# Patient Record
Sex: MALE | Race: WHITE | Employment: OTHER | URBAN - NONMETROPOLITAN AREA
[De-identification: names, ages, dates, MRNs, and addresses within clinical notes are randomized per-mention and may not be internally consistent; named-entity substitution may affect disease eponyms.]

---

## 2021-02-14 ENCOUNTER — HOSPITAL ENCOUNTER (INPATIENT)
Age: 71
LOS: 2 days | Discharge: HOME | DRG: 179 | End: 2021-02-16
Attending: INTERNAL MEDICINE | Admitting: INTERNAL MEDICINE
Payer: MEDICARE

## 2021-02-14 PROBLEM — U07.1 COVID-19 VIRUS INFECTION: Status: ACTIVE | Noted: 2021-02-14

## 2021-02-14 LAB
AMYLASE: 74 U/L (ref 28–100)
LIPASE: 112 U/L (ref 13–60)
PRO-BNP: 114 PG/ML (ref 0–900)
SARS-COV-2: POSITIVE
TROPONIN: 0.02 NG/ML (ref 0–0.03)

## 2021-02-14 PROCEDURE — 83880 ASSAY OF NATRIURETIC PEPTIDE: CPT

## 2021-02-14 PROCEDURE — 82150 ASSAY OF AMYLASE: CPT

## 2021-02-14 PROCEDURE — 2500000003 HC RX 250 WO HCPCS: Performed by: INTERNAL MEDICINE

## 2021-02-14 PROCEDURE — 2700000000 HC OXYGEN THERAPY PER DAY

## 2021-02-14 PROCEDURE — 2100000000 HC CCU R&B

## 2021-02-14 PROCEDURE — 6370000000 HC RX 637 (ALT 250 FOR IP): Performed by: INTERNAL MEDICINE

## 2021-02-14 PROCEDURE — 84484 ASSAY OF TROPONIN QUANT: CPT

## 2021-02-14 PROCEDURE — 6360000002 HC RX W HCPCS: Performed by: INTERNAL MEDICINE

## 2021-02-14 PROCEDURE — 83690 ASSAY OF LIPASE: CPT

## 2021-02-14 RX ORDER — NITROGLYCERIN 0.4 MG/1
0.4 TABLET SUBLINGUAL EVERY 5 MIN PRN
Status: ACTIVE | OUTPATIENT
Start: 2021-02-14 | End: 2021-02-15

## 2021-02-14 RX ORDER — PROMETHAZINE HYDROCHLORIDE 12.5 MG/1
12.5 TABLET ORAL EVERY 6 HOURS PRN
Status: DISCONTINUED | OUTPATIENT
Start: 2021-02-14 | End: 2021-02-16 | Stop reason: HOSPADM

## 2021-02-14 RX ORDER — ASPIRIN 81 MG/1
81 TABLET, CHEWABLE ORAL DAILY
Status: DISCONTINUED | OUTPATIENT
Start: 2021-02-15 | End: 2021-02-16 | Stop reason: HOSPADM

## 2021-02-14 RX ORDER — ZINC SULFATE 50(220)MG
50 CAPSULE ORAL DAILY
Status: DISCONTINUED | OUTPATIENT
Start: 2021-02-14 | End: 2021-02-16 | Stop reason: HOSPADM

## 2021-02-14 RX ORDER — NITROGLYCERIN 0.4 MG/1
0.4 TABLET SUBLINGUAL EVERY 5 MIN PRN
Status: DISCONTINUED | OUTPATIENT
Start: 2021-02-14 | End: 2021-02-16 | Stop reason: HOSPADM

## 2021-02-14 RX ORDER — SODIUM CHLORIDE 9 MG/ML
500 INJECTION, SOLUTION INTRAVENOUS CONTINUOUS PRN
Status: ACTIVE | OUTPATIENT
Start: 2021-02-14 | End: 2021-02-15

## 2021-02-14 RX ORDER — SODIUM CHLORIDE 0.9 % (FLUSH) 0.9 %
10 SYRINGE (ML) INJECTION PRN
Status: ACTIVE | OUTPATIENT
Start: 2021-02-14 | End: 2021-02-15

## 2021-02-14 RX ORDER — ACETAMINOPHEN 325 MG/1
650 TABLET ORAL EVERY 6 HOURS PRN
Status: DISCONTINUED | OUTPATIENT
Start: 2021-02-14 | End: 2021-02-16 | Stop reason: HOSPADM

## 2021-02-14 RX ORDER — SODIUM CHLORIDE 0.9 % (FLUSH) 0.9 %
10 SYRINGE (ML) INJECTION EVERY 12 HOURS SCHEDULED
Status: DISCONTINUED | OUTPATIENT
Start: 2021-02-14 | End: 2021-02-16 | Stop reason: HOSPADM

## 2021-02-14 RX ORDER — SODIUM CHLORIDE 0.9 % (FLUSH) 0.9 %
10 SYRINGE (ML) INJECTION PRN
Status: DISCONTINUED | OUTPATIENT
Start: 2021-02-14 | End: 2021-02-16 | Stop reason: HOSPADM

## 2021-02-14 RX ORDER — ONDANSETRON 2 MG/ML
4 INJECTION INTRAMUSCULAR; INTRAVENOUS EVERY 6 HOURS PRN
Status: DISCONTINUED | OUTPATIENT
Start: 2021-02-14 | End: 2021-02-16 | Stop reason: HOSPADM

## 2021-02-14 RX ORDER — ATORVASTATIN CALCIUM 20 MG/1
20 TABLET, FILM COATED ORAL NIGHTLY
Status: DISCONTINUED | OUTPATIENT
Start: 2021-02-14 | End: 2021-02-16 | Stop reason: HOSPADM

## 2021-02-14 RX ORDER — DOBUTAMINE HYDROCHLORIDE 200 MG/100ML
10 INJECTION INTRAVENOUS CONTINUOUS
Status: DISCONTINUED | OUTPATIENT
Start: 2021-02-14 | End: 2021-02-15

## 2021-02-14 RX ORDER — MONTELUKAST SODIUM 10 MG/1
10 TABLET ORAL NIGHTLY
Status: DISCONTINUED | OUTPATIENT
Start: 2021-02-14 | End: 2021-02-16 | Stop reason: HOSPADM

## 2021-02-14 RX ORDER — POLYETHYLENE GLYCOL 3350 17 G/17G
17 POWDER, FOR SOLUTION ORAL DAILY PRN
Status: DISCONTINUED | OUTPATIENT
Start: 2021-02-14 | End: 2021-02-16 | Stop reason: HOSPADM

## 2021-02-14 RX ORDER — ACETAMINOPHEN 650 MG/1
650 SUPPOSITORY RECTAL EVERY 6 HOURS PRN
Status: DISCONTINUED | OUTPATIENT
Start: 2021-02-14 | End: 2021-02-16 | Stop reason: HOSPADM

## 2021-02-14 RX ORDER — METOPROLOL TARTRATE 5 MG/5ML
5 INJECTION INTRAVENOUS EVERY 5 MIN PRN
Status: ACTIVE | OUTPATIENT
Start: 2021-02-14 | End: 2021-02-15

## 2021-02-14 RX ORDER — ATROPINE SULFATE 0.1 MG/ML
0.5 INJECTION INTRAVENOUS EVERY 5 MIN PRN
Status: ACTIVE | OUTPATIENT
Start: 2021-02-14 | End: 2021-02-15

## 2021-02-14 RX ORDER — CARVEDILOL 3.12 MG/1
3.12 TABLET ORAL 2 TIMES DAILY WITH MEALS
Status: DISCONTINUED | OUTPATIENT
Start: 2021-02-14 | End: 2021-02-16 | Stop reason: HOSPADM

## 2021-02-14 RX ORDER — MECOBALAMIN 5000 MCG
10 TABLET,DISINTEGRATING ORAL DAILY
Status: DISCONTINUED | OUTPATIENT
Start: 2021-02-14 | End: 2021-02-16 | Stop reason: HOSPADM

## 2021-02-14 RX ORDER — ALBUTEROL SULFATE 90 UG/1
2 AEROSOL, METERED RESPIRATORY (INHALATION) PRN
Status: ACTIVE | OUTPATIENT
Start: 2021-02-14 | End: 2021-02-15

## 2021-02-14 RX ORDER — ERGOCALCIFEROL 1.25 MG/1
50000 CAPSULE ORAL WEEKLY
Status: DISCONTINUED | OUTPATIENT
Start: 2021-02-14 | End: 2021-02-16 | Stop reason: HOSPADM

## 2021-02-14 RX ADMIN — Medication 5000 UNITS: at 19:48

## 2021-02-14 RX ADMIN — FAMOTIDINE 20 MG: 10 INJECTION, SOLUTION INTRAVENOUS at 21:20

## 2021-02-14 RX ADMIN — ATORVASTATIN CALCIUM 20 MG: 20 TABLET, FILM COATED ORAL at 19:48

## 2021-02-14 RX ADMIN — Medication 10 MG: at 19:48

## 2021-02-14 RX ADMIN — ENOXAPARIN SODIUM 40 MG: 40 INJECTION SUBCUTANEOUS at 19:48

## 2021-02-14 RX ADMIN — ZINC SULFATE 220 MG (50 MG) CAPSULE 50 MG: CAPSULE at 19:48

## 2021-02-14 RX ADMIN — MONTELUKAST SODIUM 10 MG: 10 TABLET, FILM COATED ORAL at 19:48

## 2021-02-14 RX ADMIN — CARVEDILOL 3.12 MG: 3.12 TABLET, FILM COATED ORAL at 19:48

## 2021-02-14 ASSESSMENT — ENCOUNTER SYMPTOMS
VOICE CHANGE: 0
BACK PAIN: 0
DIARRHEA: 0
NAUSEA: 0
SHORTNESS OF BREATH: 1
VOMITING: 0
COLOR CHANGE: 0
CONSTIPATION: 0

## 2021-02-15 ENCOUNTER — APPOINTMENT (OUTPATIENT)
Dept: NUCLEAR MEDICINE | Age: 71
DRG: 179 | End: 2021-02-15
Attending: INTERNAL MEDICINE
Payer: MEDICARE

## 2021-02-15 LAB
ALBUMIN SERPL-MCNC: 3.3 G/DL (ref 3.5–5.2)
ALP BLD-CCNC: 45 U/L (ref 40–130)
ALT SERPL-CCNC: 23 U/L (ref 5–41)
ANION GAP SERPL CALCULATED.3IONS-SCNC: 11 MMOL/L (ref 7–19)
AST SERPL-CCNC: 28 U/L (ref 5–40)
BILIRUB SERPL-MCNC: 0.5 MG/DL (ref 0.2–1.2)
BUN BLDV-MCNC: 22 MG/DL (ref 8–23)
CALCIUM SERPL-MCNC: 8.3 MG/DL (ref 8.8–10.2)
CHLORIDE BLD-SCNC: 106 MMOL/L (ref 98–111)
CHOLESTEROL, TOTAL: 76 MG/DL (ref 160–199)
CO2: 23 MMOL/L (ref 22–29)
CREAT SERPL-MCNC: 1.4 MG/DL (ref 0.5–1.2)
GFR AFRICAN AMERICAN: >59
GFR NON-AFRICAN AMERICAN: 50
GLUCOSE BLD-MCNC: 84 MG/DL (ref 74–109)
HCT VFR BLD CALC: 49.8 % (ref 42–52)
HDLC SERPL-MCNC: 28 MG/DL (ref 55–121)
HEMOGLOBIN: 15.9 G/DL (ref 14–18)
LDL CHOLESTEROL CALCULATED: 25 MG/DL
MCH RBC QN AUTO: 31.1 PG (ref 27–31)
MCHC RBC AUTO-ENTMCNC: 31.9 G/DL (ref 33–37)
MCV RBC AUTO: 97.5 FL (ref 80–94)
PDW BLD-RTO: 14.2 % (ref 11.5–14.5)
PLATELET # BLD: 98 K/UL (ref 130–400)
PMV BLD AUTO: 11.8 FL (ref 9.4–12.4)
POTASSIUM REFLEX MAGNESIUM: 4.4 MMOL/L (ref 3.5–5)
RBC # BLD: 5.11 M/UL (ref 4.7–6.1)
SODIUM BLD-SCNC: 140 MMOL/L (ref 136–145)
TOTAL PROTEIN: 6.3 G/DL (ref 6.6–8.7)
TRIGL SERPL-MCNC: 114 MG/DL (ref 0–149)
TROPONIN: 0.03 NG/ML (ref 0–0.03)
WBC # BLD: 6 K/UL (ref 4.8–10.8)

## 2021-02-15 PROCEDURE — 85027 COMPLETE CBC AUTOMATED: CPT

## 2021-02-15 PROCEDURE — 6370000000 HC RX 637 (ALT 250 FOR IP): Performed by: INTERNAL MEDICINE

## 2021-02-15 PROCEDURE — 2500000003 HC RX 250 WO HCPCS: Performed by: INTERNAL MEDICINE

## 2021-02-15 PROCEDURE — 80053 COMPREHEN METABOLIC PANEL: CPT

## 2021-02-15 PROCEDURE — 84484 ASSAY OF TROPONIN QUANT: CPT

## 2021-02-15 PROCEDURE — 93017 CV STRESS TEST TRACING ONLY: CPT

## 2021-02-15 PROCEDURE — A9500 TC99M SESTAMIBI: HCPCS | Performed by: INTERNAL MEDICINE

## 2021-02-15 PROCEDURE — 2100000000 HC CCU R&B

## 2021-02-15 PROCEDURE — 93005 ELECTROCARDIOGRAM TRACING: CPT | Performed by: INTERNAL MEDICINE

## 2021-02-15 PROCEDURE — 80061 LIPID PANEL: CPT

## 2021-02-15 PROCEDURE — 6370000000 HC RX 637 (ALT 250 FOR IP): Performed by: HOSPITALIST

## 2021-02-15 PROCEDURE — 2700000000 HC OXYGEN THERAPY PER DAY

## 2021-02-15 PROCEDURE — 3430000000 HC RX DIAGNOSTIC RADIOPHARMACEUTICAL: Performed by: INTERNAL MEDICINE

## 2021-02-15 PROCEDURE — 2580000003 HC RX 258: Performed by: INTERNAL MEDICINE

## 2021-02-15 PROCEDURE — 6360000002 HC RX W HCPCS: Performed by: INTERNAL MEDICINE

## 2021-02-15 RX ORDER — ATROPINE SULFATE 0.1 MG/ML
0.5 INJECTION INTRAVENOUS EVERY 5 MIN PRN
Status: ACTIVE | OUTPATIENT
Start: 2021-02-15 | End: 2021-02-16

## 2021-02-15 RX ORDER — GLIPIZIDE 2.5 MG/1
2.5 TABLET, EXTENDED RELEASE ORAL DAILY
COMMUNITY

## 2021-02-15 RX ORDER — LISINOPRIL 20 MG/1
20 TABLET ORAL DAILY
COMMUNITY

## 2021-02-15 RX ORDER — CHOLECALCIFEROL (VITAMIN D3) 50 MCG
2000 TABLET ORAL DAILY
COMMUNITY

## 2021-02-15 RX ORDER — NITROGLYCERIN 0.4 MG/1
0.4 TABLET SUBLINGUAL EVERY 5 MIN PRN
Status: ACTIVE | OUTPATIENT
Start: 2021-02-15 | End: 2021-02-16

## 2021-02-15 RX ORDER — ALLOPURINOL 300 MG/1
300 TABLET ORAL DAILY
COMMUNITY

## 2021-02-15 RX ORDER — ALBUTEROL SULFATE 90 UG/1
2 AEROSOL, METERED RESPIRATORY (INHALATION) PRN
Status: ACTIVE | OUTPATIENT
Start: 2021-02-15 | End: 2021-02-16

## 2021-02-15 RX ORDER — PIOGLITAZONEHYDROCHLORIDE 15 MG/1
15 TABLET ORAL DAILY
COMMUNITY

## 2021-02-15 RX ORDER — ATORVASTATIN CALCIUM 10 MG/1
10 TABLET, FILM COATED ORAL DAILY
COMMUNITY

## 2021-02-15 RX ORDER — METOPROLOL TARTRATE 5 MG/5ML
5 INJECTION INTRAVENOUS EVERY 5 MIN PRN
Status: ACTIVE | OUTPATIENT
Start: 2021-02-15 | End: 2021-02-16

## 2021-02-15 RX ORDER — SODIUM CHLORIDE 0.9 % (FLUSH) 0.9 %
10 SYRINGE (ML) INJECTION PRN
Status: ACTIVE | OUTPATIENT
Start: 2021-02-15 | End: 2021-02-16

## 2021-02-15 RX ORDER — GABAPENTIN 300 MG/1
300 CAPSULE ORAL 4 TIMES DAILY
Status: DISCONTINUED | OUTPATIENT
Start: 2021-02-15 | End: 2021-02-16 | Stop reason: HOSPADM

## 2021-02-15 RX ORDER — AMINOPHYLLINE DIHYDRATE 25 MG/ML
50 INJECTION, SOLUTION INTRAVENOUS PRN
Status: ACTIVE | OUTPATIENT
Start: 2021-02-15 | End: 2021-02-16

## 2021-02-15 RX ORDER — GABAPENTIN 300 MG/1
300 CAPSULE ORAL 4 TIMES DAILY
COMMUNITY

## 2021-02-15 RX ORDER — SODIUM CHLORIDE 9 MG/ML
500 INJECTION, SOLUTION INTRAVENOUS CONTINUOUS PRN
Status: ACTIVE | OUTPATIENT
Start: 2021-02-15 | End: 2021-02-16

## 2021-02-15 RX ADMIN — CARVEDILOL 3.12 MG: 3.12 TABLET, FILM COATED ORAL at 17:28

## 2021-02-15 RX ADMIN — TETRAKIS(2-METHOXYISOBUTYLISOCYANIDE)COPPER(I) TETRAFLUOROBORATE 10 MILLICURIE: 1 INJECTION, POWDER, LYOPHILIZED, FOR SOLUTION INTRAVENOUS at 13:54

## 2021-02-15 RX ADMIN — MONTELUKAST SODIUM 10 MG: 10 TABLET, FILM COATED ORAL at 20:15

## 2021-02-15 RX ADMIN — GABAPENTIN 300 MG: 300 CAPSULE ORAL at 20:15

## 2021-02-15 RX ADMIN — FAMOTIDINE 20 MG: 10 INJECTION, SOLUTION INTRAVENOUS at 20:15

## 2021-02-15 RX ADMIN — ATORVASTATIN CALCIUM 20 MG: 20 TABLET, FILM COATED ORAL at 20:16

## 2021-02-15 RX ADMIN — SODIUM CHLORIDE, PRESERVATIVE FREE 10 ML: 5 INJECTION INTRAVENOUS at 20:16

## 2021-02-15 RX ADMIN — TETRAKIS(2-METHOXYISOBUTYLISOCYANIDE)COPPER(I) TETRAFLUOROBORATE 30 MILLICURIE: 1 INJECTION, POWDER, LYOPHILIZED, FOR SOLUTION INTRAVENOUS at 13:55

## 2021-02-15 RX ADMIN — ACETAMINOPHEN 650 MG: 325 TABLET ORAL at 20:16

## 2021-02-15 RX ADMIN — REGADENOSON 0.4 MG: 0.08 INJECTION, SOLUTION INTRAVENOUS at 13:33

## 2021-02-15 ASSESSMENT — PAIN SCALES - GENERAL
PAINLEVEL_OUTOF10: 0
PAINLEVEL_OUTOF10: 0

## 2021-02-15 NOTE — PROGRESS NOTES
Gave stepdaughter an update.  Family's passcode for updates is \"[email or web address]\"    Electronically signed by Geri Jacobsen RN on 2/15/2021 at 6:07 AM

## 2021-02-15 NOTE — ACP (ADVANCE CARE PLANNING)
Advance Care Planning     Advance Care Planning Activator (Inpatient)  Conversation Note      Date of ACP Conversation: 2/15/2021    Conversation Conducted with: Patient's wife Chip Ortega    ACP Activator: Jordan Drop      [facility] Decision Maker:     Primary Decision Maker: Judith Metzger - Spouse - [phone]    Secondary Decision Maker: Issa Zuñiga - Child - [phone]    Care Preferences    Ventilation: \"If you were in your present state of health and suddenly became very ill and were unable to breathe on your own, what would your preference be about the use of a ventilator (breathing machine) if it were available to you? \"      Would the patient desire the use of ventilator (breathing machine)?: Yes    \"If your health worsens and it becomes clear that your chance of recovery is unlikely, what would your preference be about the use of a ventilator (breathing machine) if it were available to you? \"     Would the patient desire the use of ventilator (breathing machine)?: Yes      Resuscitation  \"CPR works best to restart the heart when there is a sudden event, like a heart attack, in someone who is otherwise healthy. Unfortunately, CPR does not typically restart the heart for people who have serious health conditions or who are very sick. \"    \"In the event your heart stopped as a result of an underlying serious health condition, would you want attempts to be made to restart your heart (answer \"yes\" for attempt to resuscitate) or would you prefer a natural death (answer \"no\" for do not attempt to resuscitate)? \" Yes       [] Yes   [x] No   Educated Patient / [place] [facility] regarding differences between Advance Directives and portable DNR orders.         Conversation Outcomes:  [x] ACP discussion completed    Electronically signed by Julian Hays on 2/15/2021 at 1:09 PM

## 2021-02-15 NOTE — PROGRESS NOTES
Awaiting family member to call back for updated med list.    Electronically signed by Yvette Ceron RN on 2/14/2021 at 6:53 PM

## 2021-02-15 NOTE — PROGRESS NOTES
Call lab for an update on troponin lab work I sent down around [address]. Trop is 0.02.     Electronically signed by Jack Mitchell RN on 2/14/2021 at 10:37 PM

## 2021-02-15 NOTE — H&P
[facility] Medicine H&P    Patient:  Lorrin Holstein  MRN: [id number]    Consulting Physician: Chikis Serrano DO  Reason for Consult: Medical Management  Primacy Care Physician: No primary care provider on file. HISTORY OF PRESENT ILLNESS:   The patient is a 69 y.o. male is taken in transfer from [facility] emergency department. He has had 2 weeks of progressive weakness. No priors history of any significant medical problems. He was having some epigastric pain with radiation down the left arm. This finally prompted him to go to the local emergency department. He was shown to have an elevated lipase. His CK-MB was mildly elevated however his troponin was normal.  They transferred him to our facility for higher level of care. Of note his grandson who lives with him recently tested positive for Covid and came out of 2 weeks of quarantine. He was tested in their ED today, and was shown to be Covid positive. Past Medical History:  No past medical history on file. Past Surgical History:  No past surgical history on file.     Medications: Scheduled Meds:   sodium chloride flush  10 mL Intravenous 2 times per day    famotidine (PEPCID) injection  20 mg Intravenous BID    [START ON 2/15/2021] aspirin  81 mg Oral Daily    atorvastatin  20 mg Oral Nightly    carvedilol  3.125 mg Oral BID WC    enoxaparin  40 mg Subcutaneous Daily    zinc sulfate  50 mg Oral Daily    vitamin D  50,000 Units Oral Weekly    vitamin D  5,000 Units Oral Daily    montelukast  10 mg Oral Nightly    melatonin  10 mg Oral Daily     Continuous Infusions:   DOBUTamine      sodium chloride       PRN Meds:.sodium chloride flush, promethazine **OR** ondansetron, acetaminophen **OR** acetaminophen, polyethylene glycol, nitroGLYCERIN, perflutren lipid microspheres, sodium chloride flush, sodium chloride, albuterol sulfate HFA, atropine, nitroGLYCERIN, metoprolol    Allergies:  Ibuprofen and Penicillins    Social History: Thought Content: Thought content normal.         CBC: No results for input(s): WBC, HGB, PLT in the last 72 hours. BMP:  No results for input(s): NA, K, CL, CO2, BUN, CREATININE, GLUCOSE in the last 72 hours. Hepatic: No results for input(s): AST, ALT, ALB, BILITOT, ALKPHOS in the last 72 hours. Troponin: No results for input(s): TROPONINI in the last 72 hours. BNP: No results for input(s): BNP in the last 72 hours. Lipids: No results for input(s): CHOL, HDL in the last 72 hours. Invalid input(s): LDLCALCU  ABGs: No results found for: PHART, PO2ART, WSB9PHC  INR: No results for input(s): INR in the last 72 hours. -----------------------------------------------------------------  No results found. Assessment and Plan     COVID-19 virus infection. Asymptomatic at present. Initiate adjuvant therapy. Hold off on dexamethasone. Chest pain. Serial troponins to rule out myocardial necrosis. Dobutamine stress echo tomorrow. Chemical evidence of pancreatitis. Uncertain of significance. Repeat amylase and lipase in the morning. Please document 40 minutes of critical care time for patient assessment, chart review, discussion with staff, .       Liset Fontenot DO

## 2021-02-15 NOTE — PROGRESS NOTES
4 Eyes Skin Assessment    Kirtland Bence is being assessed upon: Admission    I agree that I, Duane Kilts, along with Mary Contreras (either 2 RN's or 1 LPN and 1 RN) have performed a thorough Head to Toe Skin Assessment on the patient. ALL assessment sites listed below have been assessed. Areas assessed by both nurses:     [x]   Head, Face, and Ears   [x]   Shoulders, Back, and Chest  [x]   Arms, Elbows, and Hands   [x]   Coccyx, Sacrum, and Ischium  [x]   Legs, Feet, and Heels    Does the Patient have Skin Breakdown?  No    Anthony Prevention initiated: No  Wound Care Orders initiated: No    WOC nurse consulted for Pressure Injury (Stage 3,4, Unstageable, DTI, NWPT, and Complex wounds) and New or Established Ostomies: No        Primary Nurse eSignature: Duane Kilts, RN on 2/14/2021 at 6:46 PM      Co-Signer eSignature: {Esignature:[id number]}

## 2021-02-15 NOTE — PROGRESS NOTES
Palliative Care/Spiritual Care: Spoke with pt's wife Yassine Franco. Pt's wife says pt got very weak and unwell for more than a week but wouldn't go to the .         Alvaro Reynoso: Pt does not have an AD/LW and wife Yassine Franco would be primary decision maker. Wife says their daughter Elo Cota would be secondary. Pt is currently a full-code with CPR and Ventilator. Pain/other symptoms: Pt went to his provider for epigastric pain and Covid was discovered. Pt's wife says pt is on disability for arthritis and has arthritic pain. Social/Spiritual: Pt is a [employer] and attends [employer]. Pt/family discussion r/t goals: Pt has a wife and a daughter and lives with his wife. Pt ambulates without a walker and cares for his daily needs prior to hospitalization. Goal is for pt to return home with previous quality of life and previous daily living skills. Provided spiritual care with sustaining presence, nurtured hope, and prayer. Pt expressed gratitude for spiritual care.     Electronically signed by Thresea Osler on 2/15/2021 at 1:07 PM

## 2021-02-16 VITALS
WEIGHT: 246 LBS | HEART RATE: 73 BPM | OXYGEN SATURATION: 94 % | SYSTOLIC BLOOD PRESSURE: 127 MMHG | RESPIRATION RATE: 17 BRPM | HEIGHT: 61 IN | DIASTOLIC BLOOD PRESSURE: 73 MMHG | TEMPERATURE: 98.5 F | BODY MASS INDEX: 46.44 KG/M2

## 2021-02-16 PROBLEM — U07.1 COVID-19 VIRUS INFECTION: Status: RESOLVED | Noted: 2021-02-14 | Resolved: 2021-02-16

## 2021-02-16 PROCEDURE — 2580000003 HC RX 258: Performed by: INTERNAL MEDICINE

## 2021-02-16 PROCEDURE — 6360000002 HC RX W HCPCS: Performed by: INTERNAL MEDICINE

## 2021-02-16 PROCEDURE — 6370000000 HC RX 637 (ALT 250 FOR IP): Performed by: INTERNAL MEDICINE

## 2021-02-16 PROCEDURE — 6370000000 HC RX 637 (ALT 250 FOR IP): Performed by: HOSPITALIST

## 2021-02-16 PROCEDURE — 2500000003 HC RX 250 WO HCPCS: Performed by: INTERNAL MEDICINE

## 2021-02-16 RX ADMIN — SODIUM CHLORIDE, PRESERVATIVE FREE 10 ML: 5 INJECTION INTRAVENOUS at 08:31

## 2021-02-16 RX ADMIN — CARVEDILOL 3.12 MG: 3.12 TABLET, FILM COATED ORAL at 08:30

## 2021-02-16 RX ADMIN — FAMOTIDINE 20 MG: 10 INJECTION, SOLUTION INTRAVENOUS at 08:30

## 2021-02-16 RX ADMIN — GABAPENTIN 300 MG: 300 CAPSULE ORAL at 12:47

## 2021-02-16 RX ADMIN — ZINC SULFATE 220 MG (50 MG) CAPSULE 50 MG: CAPSULE at 08:30

## 2021-02-16 RX ADMIN — ENOXAPARIN SODIUM 40 MG: 40 INJECTION SUBCUTANEOUS at 08:31

## 2021-02-16 RX ADMIN — Medication 10 MG: at 08:31

## 2021-02-16 RX ADMIN — GABAPENTIN 300 MG: 300 CAPSULE ORAL at 08:30

## 2021-02-16 RX ADMIN — ASPIRIN 81 MG: 81 TABLET, CHEWABLE ORAL at 08:30

## 2021-02-16 RX ADMIN — Medication 5000 UNITS: at 08:31

## 2021-02-16 ASSESSMENT — ENCOUNTER SYMPTOMS
VOMITING: 0
SHORTNESS OF BREATH: 0
NAUSEA: 0
BACK PAIN: 0
DIARRHEA: 0
COLOR CHANGE: 0
CONSTIPATION: 0
VOICE CHANGE: 0

## 2021-02-16 ASSESSMENT — PAIN SCALES - GENERAL
PAINLEVEL_OUTOF10: 0

## 2021-02-16 NOTE — DISCHARGE INSTR - DIET
? Good nutrition is important when healing from an illness, injury, or surgery. Follow any nutrition recommendations given to you during your hospital stay. ? If you were given an oral nutrition supplement while in the hospital, continue to take this supplement at home. You can take it with meals, in-between meals, and/or before bedtime. These supplements can be purchased at most local grocery stores, pharmacies, and chain [employer]-stores. ? If you have any questions about your diet or nutrition, call the hospital and ask for the dietitian.

## 2021-02-16 NOTE — PROGRESS NOTES
Hospitalist Progress Note    Patient:  Cristofer Frank  YOB: 1950  Date of Service: 2/15/2021  MRN: [id number]   Acct: [de-identified]   Primary Care Physician: No primary care provider on file. Advance Directive: Full Code  Admit Date: 2/14/2021       Hospital Day: 1  Referring Provider: Suzette Brennan DO    Patient Seen, Chart, Consults, Notes, Labs, Radiology studies reviewed. Subjective:  Cristofer Frank is a 69 y.o. male  whom we are following for COVID-19 infection, chest pain. He is on room air. He is doing well. He had a Jaymie scan however the results are still pending. He denies any pulmonary complaints. His amylase and lipase normalized.     Allergies:  Ibuprofen and Penicillins    Medicines:  Current Facility-Administered Medications   Medication Dose Route Frequency Provider Last Rate Last Admin    sodium chloride flush 0.9 % injection 10 mL  10 mL Intravenous PRN Coreyy Sailors, DO        0.9 % sodium chloride infusion  500 mL Intravenous Continuous PRN Suzette Sailors, DO        albuterol sulfate  (90 Base) MCG/ACT inhaler 2 puff  2 puff Inhalation PRN Suzette Sailors, DO        atropine injection 0.5 mg  0.5 mg Intravenous Q5 Min PRN Suzette Sailors, DO        nitroGLYCERIN (NITROSTAT) SL tablet 0.4 mg  0.4 mg Sublingual Q5 Min PRN Suzette Sailors, DO        metoprolol (LOPRESSOR) injection 5 mg  5 mg Intravenous Q5 Min PRN [place] Sailors, DO        aminophylline injection 50 mg  50 mg Intravenous PRN Coreyy Sailors, DO        sodium chloride flush 0.9 % injection 10 mL  10 mL Intravenous 2 times per day Suzette Sailors, DO        sodium chloride flush 0.9 % injection 10 mL  10 mL Intravenous PRN Coreyy Sailors, DO        famotidine (PEPCID) injection 20 mg  20 mg Intravenous BID Suzette Garciaors, DO   Stopped at 02/15/21 0900    promethazine (PHENERGAN) tablet 12.5 mg  12.5 mg Oral Q6H PRN Suzette Sailors, DO Or    ondansetron (ZOFRAN) injection 4 mg  4 mg Intravenous Q6H PRN Bae Dura, DO        acetaminophen (TYLENOL) tablet 650 mg  650 mg Oral Q6H PRN Bae Dura, DO        Or    acetaminophen (TYLENOL) suppository 650 mg  650 mg Rectal Q6H PRN Bae Dura, DO        polyethylene glycol [facility]) packet 17 g  17 g Oral Daily PRN Bae Dura, DO        aspirin chewable tablet 81 mg  81 mg Oral Daily Bae Dura, DO   Stopped at 02/15/21 0900    atorvastatin (LIPITOR) tablet 20 mg  20 mg Oral Nightly Bae Dura, DO   20 mg at 02/14/21 1948    nitroGLYCERIN (NITROSTAT) SL tablet 0.4 mg  0.4 mg Sublingual Q5 Min PRN Bae Dura, DO        carvedilol (COREG) tablet 3.125 mg  3.125 mg Oral BID WC Bae Dura, DO   3.125 mg at 02/15/21 1728    enoxaparin (LOVENOX) injection 40 mg  40 mg Subcutaneous Daily Bae Dura, DO   Stopped at 02/15/21 1413    zinc sulfate (ZINCATE) capsule 50 mg  50 mg Oral Daily Bae Dura, DO   Stopped at 02/15/21 1413    vitamin D (ERGOCALCIFEROL) capsule 50,000 Units  50,000 Units Oral Weekly Bae Dura, DO        vitamin D (CHOLECALCIFEROL) capsule 5,000 Units  5,000 Units Oral Daily Bae Dura, DO   Stopped at 02/15/21 0900    montelukast (SINGULAIR) tablet 10 mg  10 mg Oral Nightly Bae Dura, DO   10 mg at 02/14/21 1948    melatonin disintegrating tablet 10 mg  10 mg Oral Daily Bae Dura, DO   Stopped at 02/15/21 0900    perflutren lipid microspheres (DEFINITY) injection 1.65 mg  1.5 mL Intravenous ONCE PRN Bae Dura, DO           Past Medical History:  No past medical history on file. Past Surgical History:  No past surgical history on file. Family History  No family history on file.     Social History  Social History     Socioeconomic History    Marital status:      Spouse name: Not on file    Number of children: Not on file    Years of education: Not on file    Highest education level: Not on file   Occupational History    Not on file   Social Needs    Financial resource strain: Not on file    Food insecurity     Worry: Not on file     Inability: Not on file    Transportation needs     Medical: Not on file     Non-medical: Not on file   Tobacco Use    Smoking status: Not on file   Substance and Sexual Activity    Alcohol use: Not on file    Drug use: Not on file    Sexual activity: Not on file   Lifestyle    Physical activity     Days per week: Not on file     Minutes per session: Not on file    Stress: Not on file   Relationships    Social connections     Talks on phone: Not on file     Gets together: Not on file     Attends [employer] service: Not on file     Active member of club or organization: Not on file     Attends meetings of clubs or organizations: Not on file     Relationship status: Not on file    Intimate partner violence     Fear of current or ex partner: Not on file     Emotionally abused: Not on file     Physically abused: Not on file     Forced sexual activity: Not on file   Other Topics Concern    Not on file   Social History Narrative    Not on file         Review of Systems:    Review of Systems   Constitutional: Negative for activity change and fever. HENT: Negative for congestion and voice change. Eyes: Negative for visual disturbance. Respiratory: Negative for shortness of breath. Cardiovascular: Negative for chest pain and leg swelling. Gastrointestinal: Negative for constipation, diarrhea, nausea and vomiting. Endocrine: Negative for polyuria. Genitourinary: Negative for difficulty urinating and dysuria. Musculoskeletal: Negative for back pain and neck pain. Skin: Negative for color change. Allergic/Immunologic: Negative for immunocompromised state. Neurological: Negative for dizziness and light-headedness. Psychiatric/Behavioral: The patient is not nervous/anxious. Objective:  Blood pressure (!) 145/74, pulse 88, temperature 96.8 °F (36 °C), temperature source Oral, resp. rate 21, height 5' 1\" (1.549 m), weight 246 lb (111.6 kg), SpO2 95 %. Intake/Output Summary (Last 24 hours) at 2/15/2021 1919  Last data filed at 2/15/2021 1740  Gross per 24 hour   Intake 791 ml   Output 700 ml   Net 91 ml       Physical Exam  Not done in an effort to preserve PPE. Patient was observed through the window. Case discussed with nurse. Labs:  BMP:   Recent Labs     02/15/21  0035      K 4.4      CO2 23   BUN 22   CREATININE 1.4*   CALCIUM 8.3*     CBC:   Recent Labs     02/15/21  0035   WBC 6.0   HGB 15.9   HCT 49.8   MCV 97.5*   PLT 98*     LIVER PROFILE:   Recent Labs     02/14/21  2019 02/15/21  0035   AST  --  28   ALT  --  23   LIPASE 112*  --    BILITOT  --  0.5   ALKPHOS  --  45     PT/INR: No results for input(s): PROTIME, INR in the last 72 hours. APTT: No results for input(s): APTT in the last 72 hours. BNP:  No results for input(s): BNP in the last 72 hours. Ionized Calcium:No results for input(s): IONCA in the last 72 hours. Magnesium:No results for input(s): MG in the last 72 hours. Phosphorus:No results for input(s): PHOS in the last 72 hours. HgbA1C: No results for input(s): LABA1C in the last 72 hours. Hepatic:   Recent Labs     02/15/21  0035   ALKPHOS 45   ALT 23   AST 28   PROT 6.3*   BILITOT 0.5   LABALBU 3.3*     Lactic Acid: No results for input(s): LACTA in the last 72 hours. Troponin: No results for input(s): CKTOTAL, CKMB, TROPONINT in the last 72 hours. ABGs: No results for input(s): PH, PCO2, PO2, HCO3, O2SAT in the last 72 hours. CRP:  No results for input(s): CRP in the last 72 hours. Sed Rate:  No results for input(s): SEDRATE in the last 72 hours. Cultures:   No results for input(s): CULTURE in the last 72 hours. No results for input(s): [place] AN in the last 72 hours.   No results for input(s): CXSURG in the last 72 hours. Radiology reports as per the Radiologist  Radiology: No results found. Assessment     COVID-19 virus infection. Asymptomatic at present. Initiate adjuvant therapy. Hold off on dexamethasone.     Chest pain. Serial troponins to rule out myocardial necrosis. Dobutamine stress echo tomorrow.     Chemical evidence of pancreatitis. Uncertain of significance. Repeat amylase and lipase in the morning.     Please document 38 minutes of critical care time for patient assessment, chart review, discussion with staff, .       Emilia Vera DO

## 2021-02-16 NOTE — DISCHARGE INSTR - COC
Continuity of Care Form    Patient Name: Anni Khan   :  1950  MRN:  [id number]    Admit date:  2021  Discharge date:  ***    Code Status Order: Full Code   Advance Directives:   Advance Care Flowsheet Documentation     Date/Time Healthcare Directive Type of Healthcare Directive Copy in [address] Agent's Name Healthcare Agent's Phone Number    02/15/21 8721  No, patient does not have an advance directive for healthcare treatment -- -- -- -- --    02/15/21 1309  No, patient does not have an advance directive for healthcare treatment -- -- -- -- --          Admitting Physician:  Lul San DO  PCP: No primary care provider on file. Discharging Nurse: [facility] Unit/Room#: 1831/723-57  Discharging Unit Phone Number: ***    Emergency Contact:   Extended Emergency Contact Information  Primary Emergency Contact: [facility] [address] [place] [address] Phone: [phone]  Relation: Spouse  Secondary Emergency Contact: Yenny Bower  [employer] Phone: [phone]  Relation: Child    Past Surgical History:  No past surgical history on file. Immunization History: There is no immunization history on file for this patient.     Active Problems:  Patient Active Problem List   Diagnosis Code   (none) - all problems resolved or deleted       Isolation/Infection:   Isolation          Droplet Plus        Patient Infection Status     Infection Onset Added Last Indicated Last Indicated By Review Planned Expiration Resolved Resolved By    COVID-19 02/14/21 02/15/21 02/14/21 COVID-19 21      Tested positive at [facility] Emergency Room prior to transfer on 2021          Nurse Assessment:  Last Vital Signs: /70   Pulse 75   Temp 98.3 °F (36.8 °C) (Axillary)   Resp 21   Ht 5' 1\" (1.549 m)   Wt 246 lb (111.6 kg)   SpO2 95%   BMI 46.48 kg/m²     Last documented pain score (0-10 scale): Pain Level: 0  Last Weight: Wt Readings from Last 1 Encounters:   21 246 lb (111.6 kg)     Mental Status:  {IP PT MENTAL STATUS:}    IV Access:  { SERGIO IV ACCESS:031837073}    Nursing Mobility/ADLs:  Walking   {CHP DME INTC:928818995}  Transfer  {CHP DME JATH:986588241}  Bathing  {CHP DME ODJL:366030292}  Dressing  {CHP DME EBNY:963831810}  Toileting  {CHP DME UDXK:309636206}  Feeding  {CHP DME IDQH:318081252}  Med Admin  {CHP DME CWPC:090192744}  Med Delivery   { SERGIO MED Delivery:628984947}    Wound Care Documentation and Therapy:        Elimination:  Continence:   · Bowel: {YES / HL:24607}  · Bladder: {YES / DI:20510}  Urinary Catheter: {Urinary Catheter:734609229}   Colostomy/Ileostomy/Ileal Conduit: {YES / EP:70154}       Date of Last BM: ***    Intake/Output Summary (Last 24 hours) at 2021 1210  Last data filed at 2021 0800  Gross per 24 hour   Intake 960 ml   Output 795 ml   Net 165 ml     I/O last 3 completed shifts:   In: 5 [P.O.:720]  Out: 795 [Urine:795]    Safety Concerns:     [address] Safety Concerns:412172527}    Impairments/Disabilities:      [address] Impairments/Disabilities:332916850}    Nutrition Therapy:  Current Nutrition Therapy:   [address] Diet List:726269789}    Routes of Feeding: {CHP DME Other Feedings:170765166}  Liquids: {Slp liquid thickness:24847}  Daily Fluid Restriction: {CHP DME Yes amt example:546020897}  Last Modified Barium Swallow with Video (Video Swallowing Test): {Done Not Done JCJI:758648017}    Treatments at the Time of Hospital Discharge:   Respiratory Treatments: ***  Oxygen Therapy:  {Therapy; copd oxygen:13423}  Ventilator:    { CC Vent KAJQ:646354186}    Rehab Therapies: {THERAPEUTIC INTERVENTION:9157685707}  Weight Bearing Status/Restrictions: [address] CC Weight Bearin}  Other Medical Equipment (for information only, NOT a DME order):  {EQUIPMENT:749887751}  Other Treatments: ***    Patient's personal belongings (please select all that are sent with patient): {CHP DME Belongings:463042180}    RN SIGNATURE:  {Esignature:732906764}    CASE MANAGEMENT/SOCIAL WORK SECTION    Inpatient Status Date: ***    Readmission Risk Assessment Score:  Readmission Risk              Risk of Unplanned Readmission:        11           Discharging to Facility/ Agency   · Name:   · Address:  · Phone:  · Fax:    Dialysis Facility (if applicable)   · Name:  · Address:  · Dialysis Schedule:  · Phone:  · Fax:    / signature: {Esignature:106642403}    PHYSICIAN SECTION    Prognosis: {Prognosis:4260078229}    Condition at Discharge: [address] [place] [address] Patient Condition:124893378}    Rehab Potential (if transferring to Rehab): {Prognosis:7919266415}    Recommended Labs or Other Treatments After Discharge: ***    Physician Certification: I certify the above information and transfer of Cristofer Frank  is necessary for the continuing treatment of the diagnosis listed and that he requires {Admit to Appropriate Level of Care:24507} for {GREATER/LESS:219171525} 30 days.      Update Admission H&P: {CHP DME Changes in [facility]:977659037}    PHYSICIAN SIGNATURE:  {Esignature:981894299}

## 2021-02-16 NOTE — CARE COORDINATION
Spoke with pt's nurse Jeffry Alcantara RN regarding his discharge and possible needs such as home O2. [employer] states that the patient is not having any covid symptoms at this time but will ask MD and pt if they would like for him to go home with home O2. Room air sat at this time is 94%. I also questioned about possible need for Eliquis.   Awaiting callback from [employer] Electronically signed by Mitul Haywood RN on 2/16/2021 at 12:31 PM

## 2021-02-16 NOTE — PLAN OF CARE
Problem: Airway Clearance - Ineffective  Goal: Achieve or maintain patent airway  2/16/2021 0936 by Leora Mccauley RN  Outcome: Ongoing  2/16/2021 0114 by Yoandy Trinh RN  Outcome: Ongoing     Problem: Gas Exchange - Impaired  Goal: Absence of hypoxia  2/16/2021 0936 by Leora Mccauley RN  Outcome: Ongoing  2/16/2021 0114 by Yoandy Trinh RN  Outcome: Ongoing  Goal: Promote optimal lung function  2/16/2021 0936 by Leora Mccauley RN  Outcome: Ongoing  2/16/2021 0114 by Yoandy Trinh RN  Outcome: Ongoing     Problem: Breathing Pattern - Ineffective  Goal: Ability to achieve and maintain a regular respiratory rate  2/16/2021 0936 by Leora Mccauley RN  Outcome: Ongoing  2/16/2021 0114 by Yoandy Trinh RN  Outcome: Ongoing     Problem:  Body Temperature -  Risk of, Imbalanced  Goal: Ability to maintain a body temperature within defined limits  2/16/2021 0936 by Leora Mccauley RN  Outcome: Ongoing  2/16/2021 0114 by Yoandy Trinh RN  Outcome: Ongoing  Goal: Will regain or maintain usual level of consciousness  2/16/2021 0936 by Leora Mccauley RN  Outcome: Ongoing  2/16/2021 0114 by Yoandy Trinh RN  Outcome: Ongoing  Goal: Complications related to the disease process, condition or treatment will be avoided or minimized  2/16/2021 0936 by Leora Mccauley RN  Outcome: Ongoing  2/16/2021 0114 by Yoandy Trinh RN  Outcome: Ongoing     Problem: Isolation Precautions - Risk of Spread of Infection  Goal: Prevent transmission of infection  2/16/2021 0936 by Leora Mccauley RN  Outcome: Ongoing  2/16/2021 0114 by Yoandy Trinh RN  Outcome: Ongoing     Problem: Nutrition Deficits  Goal: Optimize nutritional status  2/16/2021 0936 by Leora Mccauley RN  Outcome: Ongoing  2/16/2021 0114 by Yoandy Trinh RN  Outcome: Ongoing     Problem: Risk for Fluid Volume Deficit  Goal: Maintain normal heart rhythm  2/16/2021 0936 by Leora Mccauley RN  Outcome: Ongoing  2/16/2021 0114 by Yoandy Trinh RN  Outcome: Ongoing  Goal: Maintain absence of muscle cramping  2/16/2021 0936 by Charity Martinez RN  Outcome: Ongoing  2/16/2021 0114 by Felix Dalton RN  Outcome: Ongoing  Goal: Maintain normal serum potassium, sodium, calcium, phosphorus, and pH  2/16/2021 0936 by Charity Martinez RN  Outcome: Ongoing  2/16/2021 0114 by Felix Dalton RN  Outcome: Ongoing     Problem: Loneliness or Risk for Loneliness  Goal: Demonstrate positive use of time alone when socialization is not possible  2/16/2021 0936 by Charity Martinez RN  Outcome: Ongoing  2/16/2021 0114 by Felix Dalton RN  Outcome: Ongoing     Problem: Fatigue  Goal: Verbalize increase energy and improved vitality  2/16/2021 0936 by Charity Martinez RN  Outcome: Ongoing  2/16/2021 0114 by Felix Dalton RN  Outcome: Ongoing     Problem: Patient Education: Go to Patient Education Activity  Goal: Patient/Family Education  2/16/2021 0531 by Charity Martinez RN  Outcome: Ongoing  2/16/2021 0114 by Felix Dalton RN  Outcome: Ongoing

## 2021-02-16 NOTE — DISCHARGE SUMMARY
for input(s): INR in the last 72 hours. Lipids:   Recent Labs     02/15/21  0035   CHOL 76*   HDL 28*     ABGs:No results for input(s): PHART, WZN7DLL, PO2ART, PGA0DOT, BEART, HGBAE, O3IGTTUC, CARBOXHGBART, 02THERAPY in the last 72 hours. HgBA1c:  No results for input(s): LABA1C in the last 72 hours. Procedures: None  Hospital Course: Mr. Theresa Mrar was admitted on 2/14 because of chest discomfort, COVID-19 infection, chemical evidence of pancreatitis. He was admitted to the CCU. He ruled out for myocardial necrosis by serial enzymes. He underwent a Jaymie scan which was negative for ischemia. He had normalization of his amylase and lipase. Although he was positive for COVID-19 he did not have any pulmonary symptoms. On the afternoon of 2/16 I felt that he had reached maximal hospital benefit and was stable for discharge    Physical Exam:  Vital Signs: /73   Pulse 73   Temp 98.5 °F (36.9 °C) (Axillary)   Resp 17   Ht 5' 1\" (1.549 m)   Wt 246 lb (111.6 kg)   SpO2 94%   BMI 46.48 kg/m²   General appearance:. Alert and Cooperative   HEENT: Normocephalic. Chest: clear to auscultation bilaterally without wheezes or rhonchi. Cardiac: Normal heart tones with regular rate and rhythm, S1, S2 normal. No murmurs, gallops, or rubs auscultated. Abdomen:soft, non-tender; normal bowel sounds, no masses, no organomegaly. Extremities: No clubbing or cyanosis. No peripheral edema. Peripheral pulses palpable. Neurologic: Grossly intact.     Discharge Medications:       Aysha Cart   Home Medication Instructions HLB:[id number]    Printed on:02/16/21 9201   Medication Information                      allopurinol (ZYLOPRIM) 300 MG tablet  Take 300 mg by mouth daily             atorvastatin (LIPITOR) 10 MG tablet  Take 10 mg by mouth daily             Coenzyme Q10 (COQ-10 PO)  Take by mouth             dapagliflozin (FARXIGA) 10 MG tablet  Take 10 mg by mouth every morning             gabapentin (NEURONTIN) 300 MG capsule  Take 300 mg by mouth 4 times daily. glipiZIDE (GLUCOTROL XL) 2.5 MG extended release tablet  Take 2.5 mg by mouth daily             lisinopril (PRINIVIL;ZESTRIL) 20 MG tablet  Take 20 mg by mouth daily Pt takes \"as needed\"             pioglitazone (ACTOS) 15 MG tablet  Take 15 mg by mouth daily             SITagliptin (JANUVIA) 50 MG tablet  Take 50 mg by mouth daily             vitamin D (CHOLECALCIFEROL) 50 MCG (2000 UT) TABS tablet  Take 2,000 Units by mouth daily                 Discharge Instructions: Follow up with PCP in 3-5 days. Take medications as directed. Resume activity as tolerated. Diet: No diet orders on file     Disposition: Patient is medically stable and will be discharged to home. Time spent on discharge greater than 30 minutes.     Signed:  Cecille Ruvalcaba DO

## 2021-02-17 ENCOUNTER — CARE COORDINATION (OUTPATIENT)
Dept: CASE MANAGEMENT | Age: 71
End: 2021-02-17

## 2021-02-17 LAB
EKG P AXIS: 65 DEGREES
EKG P-R INTERVAL: 178 MS
EKG Q-T INTERVAL: 418 MS
EKG QRS DURATION: 98 MS
EKG QTC CALCULATION (BAZETT): 428 MS
EKG T AXIS: 28 DEGREES

## 2021-02-17 NOTE — CARE COORDINATION
[facility] Transitions Initial Follow Up Call    Call within 2 business days of discharge: Yes    Patient: Laura Vick Patient : 1950   MRN: [id number]  Reason for Admission: COVID 19  Discharge Date: 21 RARS: Readmission Risk Score: 11      Last Discharge [facility]       Complaint Diagnosis Description Type Department Provider    21   Admission (Discharged) [address],            Spoke with: N/A    Facility: [address] [place] [address]    Non-face-to-face services provided:  Reviewed encounter information for continuity of care prior to follow up Care Transitions phone call - chart notes, consults, progress notes, test results, med list, appointments, AVS, other information. Care Transitions 24 Hour Call    Care Transitions Interventions         Follow Up  No future appointments. Attempted to make contact with patient/caregiver for an initial follow up call post discharge without success. Unable to leave a message regarding intent of call and call back information. Call went to an unidentifiable voice mail.   CTN to try again at a later time. '    Yahir Mcgrath RN

## 2021-02-18 ENCOUNTER — CARE COORDINATION (OUTPATIENT)
Dept: CASE MANAGEMENT | Age: 71
End: 2021-02-18

## 2021-02-18 NOTE — CARE COORDINATION
[facility] Transitions Initial Follow Up Call    Call within 2 business days of discharge: Yes    Patient: Laura Vick Patient : 1950   MRN: [id number]    Discharge Date: 21 RARS: Readmission Risk Score: 11      Last Discharge [facility]       Complaint Diagnosis Description Type Department Provider    21   Admission (Discharged) [address] Joseph DO           Spoke with: N/A    Facility: [address] [place] [address]    Non-face-to-face services provided:  Reviewed encounter information for continuity of care prior to follow up Care Transitions phone call - chart notes, consults, progress notes, test results, med list, appointments, AVS, other information. Care Transitions 24 Hour Call    Care Transitions Interventions         Follow Up  No future appointments. Attempted to make contact with patient/caregiver for an initial follow up call post discharge without success. Unable to leave a message regarding intent of call and call back information. Call went to an unidentifiable voice mail. As this repeated attempt to make contact was unsuccessful, will disengage at this time.        Yahir Mcgrath RN

## 2021-02-22 LAB
LV EF: 69 %
LVEF MODALITY: NORMAL